# Patient Record
(demographics unavailable — no encounter records)

---

## 2024-12-12 NOTE — HISTORY OF PRESENT ILLNESS
[FreeTextEntry1] : Wants labs for annual. [de-identified] : Patient presents for his annual wellness visit expressing some concerns that it has been over a year since he was last seen.  He states that he learned about a sports castor who had routine blood work done that revealed a disorder requiring intervention.  He is here to have the blood work done to make sure he is doing well overall.  He does not recollect what blood work it was that was found to be abnormal.  He is in his usual state of health, reporting that he is now starting to intermix running and walking for the last several months.  He reports that he had undergone an extensive work up to find out why his legs were not as usual for running.  He describes one time while playing baseball, his legs just seemed to give you completely from under him.  He was black and blue all over, had gone for further evaluation elsewhere and told no findings of any consequence.

## 2024-12-12 NOTE — HISTORY OF PRESENT ILLNESS
[FreeTextEntry1] : Wants labs for annual. [de-identified] : Patient presents for his annual wellness visit expressing some concerns that it has been over a year since he was last seen.  He states that he learned about a sports castor who had routine blood work done that revealed a disorder requiring intervention.  He is here to have the blood work done to make sure he is doing well overall.  He does not recollect what blood work it was that was found to be abnormal.  He is in his usual state of health, reporting that he is now starting to intermix running and walking for the last several months.  He reports that he had undergone an extensive work up to find out why his legs were not as usual for running.  He describes one time while playing baseball, his legs just seemed to give you completely from under him.  He was black and blue all over, had gone for further evaluation elsewhere and told no findings of any consequence.

## 2024-12-12 NOTE — PHYSICAL EXAM
[No Acute Distress] : no acute distress [Well-Appearing] : well-appearing [Normal Voice/Communication] : normal voice/communication [Normal Sclera/Conjunctiva] : normal sclera/conjunctiva [Normal TMs] : both tympanic membranes were normal [No Respiratory Distress] : no respiratory distress  [Clear to Auscultation] : lungs were clear to auscultation bilaterally [No Edema] : there was no peripheral edema [Deep Tendon Reflexes (DTR)] : deep tendon reflexes were 2+ and symmetric [Normal] : affect was normal and insight and judgment were intact

## 2024-12-12 NOTE — HEALTH RISK ASSESSMENT
[Never] : Never [NO] : No [With Family] : lives with family [Retired] : retired [] :  [# Of Children ___] : has [unfilled] children [Feels Safe at Home] : Feels safe at home [Good] : ~his/her~  mood as  good [No falls in past year] : Patient reported no falls in the past year [0] : 2) Feeling down, depressed, or hopeless: Not at all (0) [PHQ-2 Negative - No further assessment needed] : PHQ-2 Negative - No further assessment needed [[id number]] : 0 [Reports changes in hearing] : Reports no changes in hearing [Reports changes in vision] : Reports no changes in vision [Reports changes in dental health] : Reports no changes in dental health [Smoke Detector] : smoke detector [Carbon Monoxide Detector] : carbon monoxide detector [Seat Belt] :  uses seat belt [ColonoscopyComments] : Never had a colonoscopy. [FreeTextEntry2] :

## 2024-12-12 NOTE — HEALTH RISK ASSESSMENT
[Never] : Never [NO] : No [With Family] : lives with family [Retired] : retired [] :  [# Of Children ___] : has [unfilled] children [Feels Safe at Home] : Feels safe at home [Good] : ~his/her~  mood as  good [No falls in past year] : Patient reported no falls in the past year [0] : 2) Feeling down, depressed, or hopeless: Not at all (0) [PHQ-2 Negative - No further assessment needed] : PHQ-2 Negative - No further assessment needed [[id number]] : 0 [Reports changes in hearing] : Reports no changes in hearing [Reports changes in vision] : Reports no changes in vision [Reports changes in dental health] : Reports no changes in dental health [Smoke Detector] : smoke detector [Carbon Monoxide Detector] : carbon monoxide detector [Seat Belt] :  uses seat belt [ColonoscopyComments] : Never had a colonoscopy. [FreeTextEntry2] :

## 2024-12-12 NOTE — REVIEW OF SYSTEMS
[Negative] : Psychiatric [de-identified] : one time noticed that both his legs were black and blue for no reason.

## 2024-12-12 NOTE — REVIEW OF SYSTEMS
[Negative] : Psychiatric [de-identified] : one time noticed that both his legs were black and blue for no reason.

## 2024-12-12 NOTE — HEALTH RISK ASSESSMENT
[Never] : Never [NO] : No [With Family] : lives with family [Retired] : retired [] :  [# Of Children ___] : has [unfilled] children [Feels Safe at Home] : Feels safe at home [Good] : ~his/her~  mood as  good [No falls in past year] : Patient reported no falls in the past year [0] : 2) Feeling down, depressed, or hopeless: Not at all (0) [PHQ-2 Negative - No further assessment needed] : PHQ-2 Negative - No further assessment needed [[id number]] : 0 [Reports changes in hearing] : Reports no changes in hearing [Reports changes in vision] : Reports no changes in vision [Reports changes in dental health] : Reports no changes in dental health [Smoke Detector] : smoke detector [Carbon Monoxide Detector] : carbon monoxide detector [Seat Belt] :  uses seat belt [ColonoscopyComments] : Never had a colonoscopy. [FreeTextEntry2] :

## 2024-12-12 NOTE — REVIEW OF SYSTEMS
[Negative] : Psychiatric [de-identified] : one time noticed that both his legs were black and blue for no reason.

## 2024-12-12 NOTE — HISTORY OF PRESENT ILLNESS
[FreeTextEntry1] : Wants labs for annual. [de-identified] : Patient presents for his annual wellness visit expressing some concerns that it has been over a year since he was last seen.  He states that he learned about a sports castor who had routine blood work done that revealed a disorder requiring intervention.  He is here to have the blood work done to make sure he is doing well overall.  He does not recollect what blood work it was that was found to be abnormal.  He is in his usual state of health, reporting that he is now starting to intermix running and walking for the last several months.  He reports that he had undergone an extensive work up to find out why his legs were not as usual for running.  He describes one time while playing baseball, his legs just seemed to give you completely from under him.  He was black and blue all over, had gone for further evaluation elsewhere and told no findings of any consequence.

## 2025-01-06 NOTE — PHYSICAL EXAM
[Alert] : alert [Oriented x 3] : ~L oriented x 3 [Well Nourished] : well nourished [Conjunctiva Non-injected] : conjunctiva non-injected [No Visual Lymphadenopathy] : no visual  lymphadenopathy [No Clubbing] : no clubbing [No Edema] : no edema [No Bromhidrosis] : no bromhidrosis [No Chromhidrosis] : no chromhidrosis [Declined] : declined [FreeTextEntry3] : Focused exam only (see below) per patient request:  few gritty erythematous papules on the scalp lentigines and SKs on face and scalp stuck on brown papules over left shin

## 2025-01-06 NOTE — HISTORY OF PRESENT ILLNESS
[FreeTextEntry1] : F/u AKs [de-identified] : Mr. ITZEL DURAN  is a 67 year man here for evaluation of below  1. AKs on the scalp s/p PDT 9/2024. Experienced crusting on the scalp afterward for about a week.  2. Growths on the left shin.   Personal hx of skin cancer: Nodular BCC on R forearm, bx 1/25/23, s/p excision on 3/10/2023.  FHx of skin cancer: no Social Hx: wife toña is my patient; retired

## 2025-01-06 NOTE — ASSESSMENT
[FreeTextEntry1] : #Actinic keratosis, scalp s/p PDT 9/2024 scalp with signif improvement - Treated 6 lesions today with LN2 X 2 cycles - The risks/benefits/alternatives of cryo-destruction was explained to the patient which include but are not limited to redness, pain, blistering, scar, discoloration of skin, and recurrence. The patient expressed understanding of these risks and agreed to the procedure. The procedure was well tolerated, without complication. We have discussed related skin care.   #Seborrheic Keratosis - These growths are benign - Related to genetics - these lesions run in families; NOT related to sun exposure - No treatment warranted unless inflamed  #Solar lentigines - Discussed etiology and benign nature of condition - Sun protective measures reinforced. Recommended OTC sunscreen products, including SPF30+ with broadband UV protection as well as proper use.  RTC 7/2025 for FBSE

## 2025-01-14 NOTE — PHYSICAL EXAM
[Alert] : alert [Normal Voice/Communication] : normal voice/communication [Sclera] : the sclera and conjunctiva were normal [Hearing Threshold Finger Rub Not Hickman] : hearing was normal [No Respiratory Distress] : no respiratory distress [Auscultation Breath Sounds / Voice Sounds] : lungs were clear to auscultation bilaterally [Heart Rate And Rhythm] : heart rate was normal and rhythm regular [Normal S1, S2] : normal S1 and S2 [None] : no edema [Bowel Sounds] : normal bowel sounds [Abdomen Tenderness] : non-tender [Abdomen Soft] : soft [No CVA Tenderness] : no CVA  tenderness [Abnormal Walk] : normal gait [No Clubbing, Cyanosis] : no clubbing or cyanosis of the fingernails [] : no rash [No Focal Deficits] : no focal deficits

## 2025-01-14 NOTE — ASSESSMENT
[FreeTextEntry1] : average risk for colon cancer, recommend colonoscopy for screening Discussed risks including but not limited to bleeding,infection,drug reaction, perforation,missed lesion,benefits and alternatives of colonoscopy/egd with patient  including no treatment and patient consents to procedure.  plan colonoscopy to be scheduled.

## 2025-01-14 NOTE — REVIEW OF SYSTEMS
[As Noted in HPI] : as noted in HPI [Fever] : no fever [Chills] : no chills [Red Eyes] : eyes not red [Chest Pain] : no chest pain [Palpitations] : no palpitations [SOB on Exertion] : no shortness of breath during exertion [Rash] : no rash [Joint Stiffness] : no joint stiffness [Skin Wound] : no skin wound [Dizziness] : no dizziness [Fainting] : no fainting [Anxiety] : no anxiety [Muscle Weakness] : no muscle weakness [Easy Bruising] : no tendency for easy bruising

## 2025-01-14 NOTE — HISTORY OF PRESENT ILLNESS
[FreeTextEntry1] : 68 yo white male referred for colonoscopy , patient reports normal bms,no brbpr, no melena.no abdominal pain, no n/v. no gerd. no weightloss.  never had a colonoscopy no family h/o colon or gastric cancer

## 2025-03-12 NOTE — HISTORY OF PRESENT ILLNESS
[FreeTextEntry1] : Follow up visit for elevated serum creatinine levels.  [de-identified] : The patient is a 68 y/o M with no significant PMHx who presents today for a follow up visit for elevated serum creatinine levels.  The patient had annual visit in Dec and found to have elevated serum cr. Cr- 1.48 eGFR 52 Patient endorsed long term use of Advil in the past. Patient reports that he used to run 30-40miles a week and used to take Advil (4-6 pills a week). He had issues with LE circulation and 3 years ago started walking instead. He takes Advil as needed (2 pills every 3-4 weeks). not on any other nephrotoxic agents.  Patient denies any urinary s/s, denies hematuria, denies muscle weakness, denies ankle edema, no h/o HTN, denies chest pain and denies palpitations.

## 2025-03-12 NOTE — PHYSICAL EXAM
[Normal] : no acute distress, well nourished, well developed and well-appearing [No Respiratory Distress] : no respiratory distress  [No Accessory Muscle Use] : no accessory muscle use [Clear to Auscultation] : lungs were clear to auscultation bilaterally [Normal Rate] : normal rate  [Regular Rhythm] : with a regular rhythm [Normal S1, S2] : normal S1 and S2 [No Edema] : there was no peripheral edema [Soft] : abdomen soft [Non Tender] : non-tender [Non-distended] : non-distended [Normal Affect] : the affect was normal [Alert and Oriented x3] : oriented to person, place, and time [Normal Mood] : the mood was normal